# Patient Record
Sex: FEMALE | Race: WHITE | Employment: FULL TIME | URBAN - METROPOLITAN AREA
[De-identification: names, ages, dates, MRNs, and addresses within clinical notes are randomized per-mention and may not be internally consistent; named-entity substitution may affect disease eponyms.]

---
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
## 2021-04-05 ENCOUNTER — OFFICE VISIT (OUTPATIENT)
Dept: URGENT CARE | Facility: CLINIC | Age: 21
End: 2021-04-05
Payer: COMMERCIAL

## 2021-04-05 ENCOUNTER — HOSPITAL ENCOUNTER (EMERGENCY)
Facility: MEDICAL CENTER | Age: 21
End: 2021-04-05
Attending: EMERGENCY MEDICINE | Admitting: EMERGENCY MEDICINE
Payer: COMMERCIAL

## 2021-04-05 VITALS
HEIGHT: 68 IN | DIASTOLIC BLOOD PRESSURE: 68 MMHG | OXYGEN SATURATION: 100 % | RESPIRATION RATE: 16 BRPM | BODY MASS INDEX: 21.15 KG/M2 | SYSTOLIC BLOOD PRESSURE: 113 MMHG | WEIGHT: 139.55 LBS | HEART RATE: 97 BPM | TEMPERATURE: 98.8 F

## 2021-04-05 VITALS
SYSTOLIC BLOOD PRESSURE: 112 MMHG | OXYGEN SATURATION: 96 % | TEMPERATURE: 99 F | HEART RATE: 120 BPM | BODY MASS INDEX: 21.67 KG/M2 | RESPIRATION RATE: 16 BRPM | HEIGHT: 68 IN | DIASTOLIC BLOOD PRESSURE: 70 MMHG | WEIGHT: 143 LBS

## 2021-04-05 DIAGNOSIS — R10.9 ACUTE ABDOMINAL PAIN IN RIGHT FLANK: ICD-10-CM

## 2021-04-05 DIAGNOSIS — N39.0 URINARY TRACT INFECTION WITH HEMATURIA, SITE UNSPECIFIED: Primary | ICD-10-CM

## 2021-04-05 DIAGNOSIS — R31.9 URINARY TRACT INFECTION WITH HEMATURIA, SITE UNSPECIFIED: Primary | ICD-10-CM

## 2021-04-05 LAB
ALBUMIN SERPL BCP-MCNC: 4.5 G/DL (ref 3.2–4.9)
ALBUMIN/GLOB SERPL: 1.5 G/DL
ALP SERPL-CCNC: 73 U/L (ref 30–99)
ALT SERPL-CCNC: 20 U/L (ref 2–50)
ANION GAP SERPL CALC-SCNC: 14 MMOL/L (ref 7–16)
APPEARANCE UR: CLEAR
APPEARANCE UR: NORMAL
AST SERPL-CCNC: 26 U/L (ref 12–45)
BACTERIA #/AREA URNS HPF: ABNORMAL /HPF
BASOPHILS # BLD AUTO: 0.3 % (ref 0–1.8)
BASOPHILS # BLD: 0.02 K/UL (ref 0–0.12)
BILIRUB SERPL-MCNC: 0.5 MG/DL (ref 0.1–1.5)
BILIRUB UR QL STRIP.AUTO: NEGATIVE
BILIRUB UR STRIP-MCNC: NEGATIVE MG/DL
BUN SERPL-MCNC: 7 MG/DL (ref 8–22)
CALCIUM SERPL-MCNC: 9.7 MG/DL (ref 8.5–10.5)
CHLORIDE SERPL-SCNC: 102 MMOL/L (ref 96–112)
CO2 SERPL-SCNC: 23 MMOL/L (ref 20–33)
COLOR UR AUTO: YELLOW
COLOR UR: YELLOW
CREAT SERPL-MCNC: 0.71 MG/DL (ref 0.5–1.4)
EOSINOPHIL # BLD AUTO: 0.02 K/UL (ref 0–0.51)
EOSINOPHIL NFR BLD: 0.3 % (ref 0–6.9)
EPI CELLS #/AREA URNS HPF: NEGATIVE /HPF
ERYTHROCYTE [DISTWIDTH] IN BLOOD BY AUTOMATED COUNT: 42.6 FL (ref 35.9–50)
FLUAV RNA SPEC QL NAA+PROBE: NEGATIVE
FLUBV RNA SPEC QL NAA+PROBE: NEGATIVE
GLOBULIN SER CALC-MCNC: 3 G/DL (ref 1.9–3.5)
GLUCOSE SERPL-MCNC: 87 MG/DL (ref 65–99)
GLUCOSE UR STRIP.AUTO-MCNC: NEGATIVE MG/DL
GLUCOSE UR STRIP.AUTO-MCNC: NEGATIVE MG/DL
HCG UR QL: NEGATIVE
HCT VFR BLD AUTO: 44.2 % (ref 37–47)
HGB BLD-MCNC: 15 G/DL (ref 12–16)
HYALINE CASTS #/AREA URNS LPF: ABNORMAL /LPF
IMM GRANULOCYTES # BLD AUTO: 0.02 K/UL (ref 0–0.11)
IMM GRANULOCYTES NFR BLD AUTO: 0.3 % (ref 0–0.9)
INT CON NEG: NEGATIVE
INT CON POS: POSITIVE
KETONES UR STRIP.AUTO-MCNC: 15 MG/DL
KETONES UR STRIP.AUTO-MCNC: NEGATIVE MG/DL
LACTATE BLD-SCNC: 1.3 MMOL/L (ref 0.5–2)
LEUKOCYTE ESTERASE UR QL STRIP.AUTO: ABNORMAL
LEUKOCYTE ESTERASE UR QL STRIP.AUTO: NORMAL
LYMPHOCYTES # BLD AUTO: 1.36 K/UL (ref 1–4.8)
LYMPHOCYTES NFR BLD: 19.3 % (ref 22–41)
MCH RBC QN AUTO: 32.3 PG (ref 27–33)
MCHC RBC AUTO-ENTMCNC: 33.9 G/DL (ref 33.6–35)
MCV RBC AUTO: 95.1 FL (ref 81.4–97.8)
MICRO URNS: ABNORMAL
MONOCYTES # BLD AUTO: 0.48 K/UL (ref 0–0.85)
MONOCYTES NFR BLD AUTO: 6.8 % (ref 0–13.4)
NEUTROPHILS # BLD AUTO: 5.15 K/UL (ref 2–7.15)
NEUTROPHILS NFR BLD: 73 % (ref 44–72)
NITRITE UR QL STRIP.AUTO: NEGATIVE
NITRITE UR QL STRIP.AUTO: NEGATIVE
NRBC # BLD AUTO: 0 K/UL
NRBC BLD-RTO: 0 /100 WBC
PH UR STRIP.AUTO: 7.5 [PH] (ref 5–8)
PH UR STRIP.AUTO: 7.5 [PH] (ref 5–8)
PLATELET # BLD AUTO: 222 K/UL (ref 164–446)
PMV BLD AUTO: 12.1 FL (ref 9–12.9)
POC URINE PREGNANCY TEST: NEGATIVE
POTASSIUM SERPL-SCNC: 3.9 MMOL/L (ref 3.6–5.5)
PROT SERPL-MCNC: 7.5 G/DL (ref 6–8.2)
PROT UR QL STRIP: NEGATIVE MG/DL
PROT UR QL STRIP: NEGATIVE MG/DL
RBC # BLD AUTO: 4.65 M/UL (ref 4.2–5.4)
RBC # URNS HPF: ABNORMAL /HPF
RBC UR QL AUTO: ABNORMAL
RBC UR QL AUTO: NORMAL
RSV RNA SPEC QL NAA+PROBE: NEGATIVE
SARS-COV-2 RNA RESP QL NAA+PROBE: NOTDETECTED
SODIUM SERPL-SCNC: 139 MMOL/L (ref 135–145)
SP GR UR STRIP.AUTO: 1
SP GR UR STRIP.AUTO: 1.02
SPECIMEN SOURCE: NORMAL
UROBILINOGEN UR STRIP-MCNC: 0.2 MG/DL
UROBILINOGEN UR STRIP.AUTO-MCNC: 0.2 MG/DL
WBC # BLD AUTO: 7.1 K/UL (ref 4.8–10.8)
WBC #/AREA URNS HPF: ABNORMAL /HPF

## 2021-04-05 PROCEDURE — 99284 EMERGENCY DEPT VISIT MOD MDM: CPT

## 2021-04-05 PROCEDURE — 0241U HCHG SARS-COV-2 COVID-19 NFCT DS RESP RNA 4 TRGT MIC: CPT

## 2021-04-05 PROCEDURE — 81002 URINALYSIS NONAUTO W/O SCOPE: CPT | Performed by: NURSE PRACTITIONER

## 2021-04-05 PROCEDURE — 99214 OFFICE O/P EST MOD 30 MIN: CPT | Performed by: NURSE PRACTITIONER

## 2021-04-05 PROCEDURE — 87186 SC STD MICRODIL/AGAR DIL: CPT

## 2021-04-05 PROCEDURE — 700105 HCHG RX REV CODE 258: Performed by: EMERGENCY MEDICINE

## 2021-04-05 PROCEDURE — 80053 COMPREHEN METABOLIC PANEL: CPT

## 2021-04-05 PROCEDURE — 81025 URINE PREGNANCY TEST: CPT | Performed by: NURSE PRACTITIONER

## 2021-04-05 PROCEDURE — 83605 ASSAY OF LACTIC ACID: CPT

## 2021-04-05 PROCEDURE — 96375 TX/PRO/DX INJ NEW DRUG ADDON: CPT

## 2021-04-05 PROCEDURE — 94760 N-INVAS EAR/PLS OXIMETRY 1: CPT

## 2021-04-05 PROCEDURE — 87077 CULTURE AEROBIC IDENTIFY: CPT

## 2021-04-05 PROCEDURE — 87086 URINE CULTURE/COLONY COUNT: CPT

## 2021-04-05 PROCEDURE — 96365 THER/PROPH/DIAG IV INF INIT: CPT

## 2021-04-05 PROCEDURE — 85025 COMPLETE CBC W/AUTO DIFF WBC: CPT

## 2021-04-05 PROCEDURE — 81025 URINE PREGNANCY TEST: CPT

## 2021-04-05 PROCEDURE — C9803 HOPD COVID-19 SPEC COLLECT: HCPCS | Performed by: EMERGENCY MEDICINE

## 2021-04-05 PROCEDURE — 81001 URINALYSIS AUTO W/SCOPE: CPT

## 2021-04-05 PROCEDURE — 87040 BLOOD CULTURE FOR BACTERIA: CPT

## 2021-04-05 PROCEDURE — 700111 HCHG RX REV CODE 636 W/ 250 OVERRIDE (IP): Performed by: EMERGENCY MEDICINE

## 2021-04-05 RX ORDER — ONDANSETRON 2 MG/ML
4 INJECTION INTRAMUSCULAR; INTRAVENOUS ONCE
Status: COMPLETED | OUTPATIENT
Start: 2021-04-05 | End: 2021-04-05

## 2021-04-05 RX ORDER — SULFAMETHOXAZOLE AND TRIMETHOPRIM 800; 160 MG/1; MG/1
1 TABLET ORAL 2 TIMES DAILY
Qty: 14 TABLET | Refills: 0 | Status: SHIPPED | OUTPATIENT
Start: 2021-04-05 | End: 2021-04-12

## 2021-04-05 RX ORDER — SODIUM CHLORIDE 9 MG/ML
1000 INJECTION, SOLUTION INTRAVENOUS ONCE
Status: COMPLETED | OUTPATIENT
Start: 2021-04-05 | End: 2021-04-05

## 2021-04-05 RX ORDER — KETOROLAC TROMETHAMINE 30 MG/ML
15 INJECTION, SOLUTION INTRAMUSCULAR; INTRAVENOUS ONCE
Status: COMPLETED | OUTPATIENT
Start: 2021-04-05 | End: 2021-04-05

## 2021-04-05 RX ADMIN — ONDANSETRON 4 MG: 2 INJECTION INTRAMUSCULAR; INTRAVENOUS at 16:54

## 2021-04-05 RX ADMIN — KETOROLAC TROMETHAMINE 15 MG: 30 INJECTION, SOLUTION INTRAMUSCULAR at 18:38

## 2021-04-05 RX ADMIN — CEFTRIAXONE SODIUM 2 G: 2 INJECTION, POWDER, FOR SOLUTION INTRAMUSCULAR; INTRAVENOUS at 18:38

## 2021-04-05 RX ADMIN — SODIUM CHLORIDE 1000 ML: 9 INJECTION, SOLUTION INTRAVENOUS at 16:53

## 2021-04-05 ASSESSMENT — ENCOUNTER SYMPTOMS
CHILLS: 0
BACK PAIN: 1
FEVER: 1
SHORTNESS OF BREATH: 0
FLANK PAIN: 1
ABDOMINAL PAIN: 1
VOMITING: 1
NAUSEA: 1

## 2021-04-05 ASSESSMENT — PAIN DESCRIPTION - PAIN TYPE: TYPE: ACUTE PAIN

## 2021-04-05 NOTE — ED TRIAGE NOTES
Sherrie Cordoba  20 y.o.  Chief Complaint   Patient presents with   • Flank Pain     R flank pain radiating to RLQ x 4 days; reports N/V, fevers, malaise; pt reports hx of pyelonephritis multiple times; reports UC saw hematuria; reports malodorous urine; denies dysuria, frequency, urgency

## 2021-04-05 NOTE — ED PROVIDER NOTES
ED Provider Note    Scribed for KAYA Bojorquez II* by Venice Mireles. 4/5/2021  4:23 PM    Means of Arrival: Walk in  History obtained by: patient   Limitations: none    CHIEF COMPLAINT  Chief Complaint   Patient presents with   • Flank Pain     R flank pain radiating to RLQ x 4 days; reports N/V, fevers, malaise; pt reports hx of pyelonephritis multiple times; reports UC saw hematuria; reports malodorous urine; denies dysuria, frequency, urgency       HPI  Sherrie Cordoba is a 20 y.o. female who presents to the Emergency Department for right sided flank pain onset Thursday. Patient has associated symptoms of lower back pain, malodorous urine, urinary frequency, low grade fever, nausea, vomiting, and suprapubic abdominal pain. She states she has experienced similar symptoms previously before and was diagnosed with kidney infection. She was seen at urgent care earlier today and was advised to come to the ED secondary to hematuria.     REVIEW OF SYSTEMS  Review of Systems   Constitutional: Positive for fever. Negative for chills.   Respiratory: Negative for shortness of breath.    Cardiovascular: Negative for chest pain.   Gastrointestinal: Positive for abdominal pain, nausea and vomiting.   Genitourinary: Positive for flank pain and frequency. Negative for dysuria.        Positive for malodorous urine   Musculoskeletal: Positive for back pain.   All other systems reviewed and are negative.    See HPI for further details.    PAST MEDICAL HISTORY   None pertinent    SOCIAL HISTORY  Social History     Tobacco Use   • Smoking status: Never Smoker   • Smokeless tobacco: Never Used   Substance and Sexual Activity   • Alcohol use: No   • Drug use: No   • Sexual activity: Never       SURGICAL HISTORY  patient denies any surgical history    CURRENT MEDICATIONS  Home Medications    **Home medications have not yet been reviewed for this encounter**         ALLERGIES  Allergies   Allergen Reactions   • Nkda [No Known  "Drug Allergy]        PHYSICAL EXAM  VITAL SIGNS: /75   Pulse 66   Temp 37.1 °C (98.8 °F) (Temporal)   Resp 16   Ht 1.727 m (5' 8\")   Wt 63.3 kg (139 lb 8.8 oz)   SpO2 99%   BMI 21.22 kg/m²     Pulse ox interpretation: I interpret this pulse ox as normal.  Constitutional: Alert in no apparent distress.  HENT: No signs of trauma, Bilateral external ears normal, Nose normal.   Eyes: Pupils are equal, Conjunctiva normal, Non-icteric.   Neck: Normal range of motion, No tenderness, Supple, No stridor.   Cardiovascular: Increased rate and regular rhythm, no murmurs. Symmetric distal pulses. No cyanosis of extremities. No peripheral edema of extremities.  Thorax & Lungs: Normal breath sounds, No respiratory distress, No wheezing, No chest tenderness.   Abdomen: Bowel sounds normal, Soft, suprapubic tenderness, No masses, No pulsatile masses. No peritoneal signs.  Skin: Warm, Dry, No erythema, No rash.   Back: No midline bony tenderness, Right CVA tenderness to percussion.   Musculoskeletal: Good range of motion in all major joints. No tenderness to palpation or major deformities noted.   Neurologic: Alert , Normal motor function, Normal sensory function, No focal deficits noted.   Psychiatric: Affect normal, Judgment normal, Mood normal.       DIAGNOSTIC STUDIES / PROCEDURES    LABS  Pertinent Labs & Imaging studies reviewed. (See chart for details)    COURSE & MEDICAL DECISION MAKING  Pertinent Labs & Imaging studies reviewed. (See chart for details)    4:23 PM This is a 20 y.o. female who presents with flank pain and the differential diagnosis includes but is not limited to, very concerning for pyelonephritis.  Will evaluate further for sepsis.  Ordered for lactic aid, COVID/SARS, CBC w/ diff, CMP, blood culture, UA, urine culture, and Beta HCG to evaluate. Patient will be treated with Zofran 4 mg for nausea, and IV fluids for tachycardia.     I have ordered continuous pulse ox and cardiac monitoring. Pulse " ox shows a normal wave form with normal saturations >95% on room air. Cardiac monitors show a normal sinus rhythm at a rate of 115 bpm without ectopy.     6:11 PM Patient was reevaluated at bedside. She is feeling slightly improved at this time. Discussed lab and radiology results with the patient and informed them that she will be treated with IV rocephin here in the ER.  Serum studies not reveal leukocytosis.  Overall serum studies within acceptable limits.  Blood cultures are pending.  Urine culture is also pending.  Urinalysis with  white blood cells.  2-5 red blood cells.  There is also bacteria present.  There could be similar findings if there was a renal stone however her history of having strong odor to her urine, pain with urination, urinary frequency then followed by flank pain I believe this presentation more likely consistent with pyelonephritis.  Prescribed bactrim. She will be treated with Toradol 15 mg for continuing pain. Discussed return precautions. Patient will be discharged at this time. She verbalizes agreement with discharge and plan of care.    HYDRATION: Based on the patient's presentation of Tachycardia the patient was given IV fluids. IV Hydration was used because oral hydration was not adequate alone. Upon recheck following hydration, the patient was improved.     The patient will return for worsening symptoms and is stable at the time of discharge. The patient verbalizes understanding and will comply. Guidance provided on appropriate use of medications.    DISPOSITION:  Patient will be discharged home in stable condition.    FOLLOW UP:  [facility], Emergency Dept  [address] [address]  [phone]    Temperature >101F, frequent vomiting, worsening pain or other serious concerns come back to the ER      OUTPATIENT MEDICATIONS:  Discharge Medication List as of 4/5/2021  7:12 PM      START taking these medications    Details    sulfamethoxazole-trimethoprim (BACTRIM DS) 800-160 MG tablet Take 1 tablet by mouth 2 times a day for 7 days., Disp-14 tablet, R-0, Normal             FINAL IMPRESSION  1. Urinary tract infection with hematuria, site unspecified Active         Venice GIBBS (Scribbell), am scribing for, and in the presence of, CYDNEY Bojorquez II.    Electronically signed by: Venice Mireles (Katty), 4/5/2021    IAftab II, M* personally performed the services described in this documentation, as scribed by Venice Mireles in my presence, and it is both accurate and complete. C    The note accurately reflects work and decisions made by me.  Aftab Brooks II, M.D.  4/5/2021  8:30 PM

## 2021-04-05 NOTE — PROGRESS NOTES
Chief Complaint   Patient presents with   • UTI     x 5 days pt states she has hx of kidney infections. pt stated symptoms were worse at night       HISTORY OF PRESENT ILLNESS: Patient is a pleasant 20 y.o. female who presents to urgent care today with complaints of right abdominal and flank pain.  Her symptoms started 4 days ago with severe pain, the pain lasted a few days, improved from 1 day, then again returned.  Admits to associated fever, chills, malaise, vomiting, nausea.  Denies any dysuria, urinary urgency or frequency.  She does have a history of kidney infection, this feels somewhat similar.    Patient Active Problem List    Diagnosis Date Noted   • Vitamin D deficiency 07/12/2018   • Vasovagal syncope 05/30/2018       Allergies:Nkda [no known drug allergy]    Current Outpatient Medications Ordered in Epic   Medication Sig Dispense Refill   • ascorbic acid (ASCORBIC ACID) 500 MG Tab Take 500 mg by mouth every day.     • Pseudoephedrine-APAP-DM (DAYQUIL PO) Take  by mouth.     • ibuprofen (MOTRIN) 200 MG Tab Take 400 mg by mouth every 6 hours as needed.       No current Epic-ordered facility-administered medications on file.       History reviewed. No pertinent past medical history.    Social History     Tobacco Use   • Smoking status: Never Smoker   • Smokeless tobacco: Never Used   Substance Use Topics   • Alcohol use: No   • Drug use: No       Family Status   Relation Name Status   • Mo  Alive   • Fa  Alive   History reviewed. No pertinent family history.    ROS:  Review of Systems   Constitutional: Positive for fever, chills,malaise, and fatigue.   HENT: Negative for ear pain, nosebleeds, congestion, sore throat and neck pain.    Eyes: Negative for vision changes.   Neuro: Negative for headache, sensory changes, weakness, seizure, LOC.   Cardiovascular: Negative for chest pain, palpitations, orthopnea and leg swelling.   Respiratory: Negative for cough, sputum production, shortness of breath and  "wheezing.   Gastrointestinal: Positive for right-sided abdominal pain, nausea, vomiting.   Negative for diarrhea.   Genitourinary: Positive for right-sided flank pain.  Negative for dysuria, urgency and frequency.  Musculoskeletal: Negative for falls, neck pain, back pain, joint pain, myalgias.   Skin: Negative for rash, diaphoresis.     Exam:  /70 (BP Location: Left arm, Patient Position: Sitting, BP Cuff Size: Adult)   Pulse (!) 120   Temp 37.2 °C (99 °F) (Temporal)   Resp 16   Ht 1.727 m (5' 8\")   Wt 64.9 kg (143 lb)   SpO2 96%   General: well-nourished, well-developed female in NAD  Head: normocephalic, atraumatic  Eyes: PERRLA, no conjunctival injection, acuity grossly intact, lids normal.  Ears: normal shape and symmetry, no tenderness, no discharge. External canals are without any significant edema or erythema. Tympanic membranes are without any inflammation, no effusion. Gross auditory acuity is intact.  Nose: symmetrical without tenderness, no discharge.  Mouth/Throat: reasonable hygiene, no erythema, exudates or tonsillar enlargement.  Neck: no masses, range of motion within normal limits, no tracheal deviation. No obvious thyroid enlargement.   Lymph: no cervical adenopathy. No supraclavicular adenopathy.   Neuro: alert and oriented. Cranial nerves 1-12 grossly intact. No sensory deficit.   Cardiovascular: Tachycardic rate and regular rhythm. No edema.  Pulmonary: no distress. Chest is symmetrical with respiration, no wheezes, crackles, or rhonchi.   Abdomen: soft, right lateral tenderness, no guarding, no hepatosplenomegaly.  Right-sided CVA tenderness  Musculoskeletal: no clubbing, appropriate muscle tone, gait is stable.  Skin: warm, dry, intact, no clubbing, no cyanosis, no rashes.   Psych: appropriate mood, affect, judgement.       POC pregnancy negative      POC urine positive for ketones, blood, leukocytes      Assessment/Plan:  1. Acute abdominal pain in right flank  POCT Pregnancy    " POCT Urinalysis       The patient is a 20-year-old female who presents to the urgent care today with complaints of right-sided abdominal and flank pain. Differential diagnoses include but are not limited to: Pyelonephritis, nephrolithiasis, appendicitis.  The patient is tachycardic in clinic at 120 and has correlating abdominal tenderness. At this time, I feel the patient requires a higher level of care in the ED for closer monitoring, stat lab work and/or imaging for further evaluation. This has been discussed with the patient and she states agreement and understanding. The patient is stable to leave POV at this time and will go directly to ED without delay, will remain n.p.o.         Please note that this dictation was created using voice recognition software. I have made every reasonable attempt to correct obvious errors, but I expect that there are errors of grammar and possibly content that I did not discover before finalizing the note.      ANNALISE Nolen.

## 2021-04-06 NOTE — ED NOTES
Agree with triage note.     Pt is alert and oriented, speaking in full sentences, follows commands and responds appropriately to questions. NAD. Resp are even and unlabored.     Patient and staff wearing appropriate PPE

## 2021-04-06 NOTE — ED NOTES
DC home with instructions for UTI. She has been provided written and verbal education for worsening s/s. 1 prescription have been sent to her [facility] pharmacy. She verbalized understanding of how to take that medication. [facility]

## 2021-04-07 NOTE — ED NOTES
"ED Positive Culture Follow-up/Notification Note:    Date: 4/7/2021     Patient seen in the ED on 4/5/2021 for flank pain.   1. Urinary tract infection with hematuria, site unspecified Active      Discharge Medication List as of 4/5/2021  7:12 PM      START taking these medications    Details   sulfamethoxazole-trimethoprim (BACTRIM DS) 800-160 MG tablet Take 1 tablet by mouth 2 times a day for 7 days., Disp-14 tablet, R-0, Normal           Ceftriaxone 2g IV x1 given in ED     Allergies: Nkda [no known drug allergy]     Vitals:    04/05/21 1423 04/05/21 1448 04/05/21 1623 04/05/21 1704   BP: 103/75  112/61 113/68   Pulse: 66  (!) 102 97   Resp: 16      Temp: 37.1 °C (98.8 °F)      TempSrc: Temporal      SpO2: 99%  98% 100%   Weight:  63.3 kg (139 lb 8.8 oz)     Height: 1.727 m (5' 8\")          Final cultures:   Results     Procedure Component Value Units Date/Time    URINE CULTURE(NEW) [[id number]]  (Abnormal) Collected: 04/05/21 1608    Order Status: Completed Specimen: Urine, Clean Catch Updated: 04/07/21 0913     Significant Indicator POS     Source UR     Site URINE, CLEAN CATCH     Culture Result -      Staphylococcus saprophyticus  10-50,000 cfu/mL      Narrative:      Indication for culture:->Evaluation for sepsis without a  clear source of infection  Indication for culture:->Evaluation for sepsis without a    BLOOD CULTURE [[id number]] Collected: 04/05/21 1650    Order Status: Completed Specimen: Blood from Peripheral Updated: 04/06/21 0855     Significant Indicator NEG     Source BLD     Site PERIPHERAL     Culture Result No Growth  Note: Blood cultures are incubated for 5 days and  are monitored continuously.Positive blood cultures  are called to the RN and reported as soon as  they are identified.      Narrative:      1 of 2 for Blood Culture x 2 sites order. Per Hospital  Policy: Only change Specimen Src: to \"Line\" if specified by  physician order.  No site indicated    BLOOD CULTURE [[id number]] Collected: " "04/05/21 1702    Order Status: Completed Specimen: Blood from Peripheral Updated: 04/06/21 0855     Significant Indicator NEG     Source BLD     Site PERIPHERAL     Culture Result No Growth  Note: Blood cultures are incubated for 5 days and  are monitored continuously.Positive blood cultures  are called to the RN and reported as soon as  they are identified.      Narrative:      2 of 2 blood culture x2  Sites order. Per Hospital Policy:  Only change Specimen Src: to \"Line\" if specified by physician  order.  No site indicated    COV-2, FLU A/B, AND RSV BY PCR (2-4 HOURS CEPHEID): Collect NP swab in VTM [[id number]] Collected: 04/05/21 1650    Order Status: Completed Specimen: Respirate from Nasopharyngeal Updated: 04/05/21 1751     Influenza virus A RNA Negative     Influenza virus B, PCR Negative     RSV, PCR Negative     SARS-CoV-2 by PCR NotDetected     Comment: PATIENTS: Important information regarding your results and instructions can  be found at [email or web address]   \"After your  Covid-19 Test\"  [facility] providers: PLEASE REFER TO DE-ESCALATION AND RETESTING PROTOCOL  on [email or web address]  **The [employer] GeneXpert Xpress SARS-CoV-2 RT-PCR Test has been made  available for use under the Emergency Use Authorization (EUA) only.          SARS-CoV-2 Source NP Swab    Narrative:      Have you been in close contact with a person who is suspected  or known to be positive for COVID-19 within the last 30 days  (e.g. last seen that person < 30 days ago)->Unknown    URINALYSIS [[id number]]  (Abnormal) Collected: 04/05/21 1608    Order Status: Completed Specimen: Urine, Clean Catch Updated: 04/05/21 1747     Color Yellow     Character Clear     Specific Gravity 1.005     Ph 7.5     Glucose Negative mg/dL      Ketones Negative mg/dL      Protein Negative mg/dL      Bilirubin Negative     Urobilinogen, Urine 0.2     Nitrite Negative     Leukocyte Esterase Large     Occult Blood Small     Micro Urine " Req Microscopic    Narrative:      Indication for culture:->Evaluation for sepsis without a  clear source of infection          Plan:   Staphylococcus saprophyticus can be a cause of UTI, likely covered by Bactrim. Due to lack of sensitivities will contact patient to ensure improvement.   Patient stated flank pain has overall improved, she did state shes been having some nausea since starting antibiotics. Denied fever or chills   Gave patient strict return precautions if nausea does not resolve, flank pain worsens or if patient gets febrile or chills to return to the ER. She agreed.   I have also contacted micro to get susceptibilities on staph saprophyticus, I will follow up with results.     Addendum 4/8/2021 1650   Contacted [facility] to follow up on sensitives, micro said they do not run sensitivities on staph saprophyticus in urine. Patient has return precautions in place.     Nathan Gustafson, PharmD

## 2021-04-10 LAB
BACTERIA BLD CULT: NORMAL
BACTERIA BLD CULT: NORMAL
SIGNIFICANT IND 70042: NORMAL
SIGNIFICANT IND 70042: NORMAL
SITE SITE: NORMAL
SITE SITE: NORMAL
SOURCE SOURCE: NORMAL
SOURCE SOURCE: NORMAL

## 2024-09-07 ENCOUNTER — OFFICE VISIT (OUTPATIENT)
Dept: URGENT CARE | Facility: PHYSICIAN GROUP | Age: 24
End: 2024-09-07
Payer: COMMERCIAL

## 2024-09-07 VITALS
HEART RATE: 83 BPM | HEIGHT: 68 IN | OXYGEN SATURATION: 98 % | BODY MASS INDEX: 19.4 KG/M2 | SYSTOLIC BLOOD PRESSURE: 98 MMHG | WEIGHT: 128 LBS | DIASTOLIC BLOOD PRESSURE: 62 MMHG | TEMPERATURE: 98.3 F | RESPIRATION RATE: 16 BRPM

## 2024-09-07 DIAGNOSIS — L30.9 PERIORBITAL DERMATITIS: ICD-10-CM

## 2024-09-07 PROCEDURE — 3074F SYST BP LT 130 MM HG: CPT | Performed by: PHYSICIAN ASSISTANT

## 2024-09-07 PROCEDURE — 3078F DIAST BP <80 MM HG: CPT | Performed by: PHYSICIAN ASSISTANT

## 2024-09-07 PROCEDURE — 99213 OFFICE O/P EST LOW 20 MIN: CPT | Performed by: PHYSICIAN ASSISTANT

## 2024-09-07 RX ORDER — TRIAMCINOLONE ACETONIDE 0.25 MG/G
1 CREAM TOPICAL 2 TIMES DAILY
Qty: 15 G | Refills: 0 | Status: SHIPPED | OUTPATIENT
Start: 2024-09-07

## 2024-09-07 RX ORDER — METHADONE HYDROCHLORIDE 40 MG/1
36 TABLET ORAL
COMMUNITY

## 2024-09-07 ASSESSMENT — ENCOUNTER SYMPTOMS: EYE REDNESS: 1

## 2024-09-07 NOTE — LETTER
[facility] [address] [address]     September 7, 2024    Patient: Sherrie Cordoba   YOB: 2000   Date of Visit: 9/7/2024       To Whom It May Concern:    Sherrie Cordoba was seen and treated in our department on 9/7/2024.  Please excuse her from work today.    Sincerely,     Duncan Beckett P.A.-C.

## 2024-09-07 NOTE — PROGRESS NOTES
Chief Complaint   Patient presents with    Eye Swelling     Bilateral eye swelling, skincare has changed recently, has used aquafor on eyes and had a rash on eyes, 4 days ago eyes were peeling and used a eye cream and it burned eyes, woke up this morning with eyes swelling, blurry vision, eyes feels heavy, has used aquafor around ears and had the same rash and now its in the peeling stage        HISTORY OF PRESENT ILLNESS: Patient is a 24 y.o. female who presents today because bilateral eyes swelling for the last several weeks.  She had changed acne cleansing medicines and thinks that this may have been the trigger.  She has been using Aquaphor since which she has used in the past and has always had periorbital facial rashes.  She has not seen dermatology.  She denies a history of asthma or eczema.  She used Aquaphor again last night and woke up with an even more irritated rash.  She denies any change in vision or sinus pressure.    Patient Active Problem List    Diagnosis Date Noted    Vitamin D deficiency 07/12/2018    Vasovagal syncope 05/30/2018       Allergies:Nkda [no known drug allergy]    Current Outpatient Medications Ordered in Epic   Medication Sig Dispense Refill    methadone (DOLOPHINE) 40 MG TABLET SOLUBLE Take 36 mg by mouth.      ascorbic acid (ASCORBIC ACID) 500 MG Tab Take 500 mg by mouth every day. (Patient not taking: Reported on 9/7/2024)      Pseudoephedrine-APAP-DM (DAYQUIL PO) Take  by mouth. (Patient not taking: Reported on 9/7/2024)      ibuprofen (MOTRIN) 200 MG Tab Take 400 mg by mouth every 6 hours as needed. (Patient not taking: Reported on 9/7/2024)       No current [facility]-ordered facility-administered medications on file.       History reviewed. No pertinent past medical history.    Social History     Tobacco Use    Smoking status: Never    Smokeless tobacco: Never   Vaping Use    Vaping status: Never Used   Substance Use Topics    Alcohol use: No    Drug use: No       Family Status  "  Relation Name Status    Mo  Alive    Fa  Alive   No partnership data on file   History reviewed. No pertinent family history.    Review of Systems   Eyes:  Positive for redness.   Skin:  Positive for rash.   All other systems reviewed and are negative.      Exam:  BP 98/62 (BP Location: Right arm, Patient Position: Sitting, BP Cuff Size: Adult)   Pulse 83   Temp 36.8 °C (98.3 °F) (Temporal)   Resp 16   Ht 1.727 m (5' 8\")   Wt 58.1 kg (128 lb)   SpO2 98%     Nursing notes and vitals reviewed.    Constitutional:   Appropriately groomed, pleasant affect, well nourished, in NAD.    Head:   Normocephalic, atraumatic.    Eyes:   PERRLA, EOM's full, sclera injected, conjunctiva not erythematous, and medial canthus without exudate.  No preauricular lymphadenopathy.  Periorbital erythema and fine scaling present    Ears:  Hearing grossly intact to voice.    Nose:  Nares patent bilaterally.  Nasal mucosa not edematous.      Throat:  Dentition wnl, mucosa moist without lesions.  Oropharynx not erythematous, with no enlargement of the palatine tonsils bilaterally with no exudates.    No post nasal drainage present.  Soft palate rises symmetrically bilaterally and uvula midline.      Neck: Neck supple, with mild anterior lymphadenopathy that is soft and mobile to palpation. Thyroid non-palpable without tenderness or nodules. No supraclavicular lymphadenopathy.    Lungs:  Respiratory effort not labored without accessory muscle use.     Musculoskeletal:  Gait non-antalgic with a narrow base.    Derm:  Skin without rashes or lesions with good turgor pressure.      Psychiatric:  Mood, affect, and judgement appropriate.    Please note that this dictation was created using voice recognition software. I have made every reasonable attempt to correct obvious errors, but I expect that there are errors of grammar and possibly content that I did not discover before finalizing the note.    Assessment/Plan:  1. Periorbital dermatitis  " Referral to Dermatology    triamcinolone acetonide (KENALOG) 0.025 % Cream        Patient presents with periorbital dermatitis/eczema.  Plan to treat with a low potency topical steroid referred patient to dermatology for definitive management.  Patient has a history of recurrent periorbital dermatitis and we did discuss possible allergic causes.  Advised against face washes or creams at this time.  She can use warm water to rinse.    Instructed to return to Urgent Care or nearest Emergency Department if symptoms fail to improve, for any change in condition, further concerns, or new concerning symptoms. Patient states understanding of the plan of care and discharge instructions.    Duncan Beckett PA-C

## 2025-05-20 ENCOUNTER — HOSPITAL ENCOUNTER (OUTPATIENT)
Facility: MEDICAL CENTER | Age: 25
End: 2025-05-20
Attending: STUDENT IN AN ORGANIZED HEALTH CARE EDUCATION/TRAINING PROGRAM
Payer: COMMERCIAL

## 2025-05-20 ENCOUNTER — OFFICE VISIT (OUTPATIENT)
Dept: OBGYN | Facility: CLINIC | Age: 25
End: 2025-05-20
Payer: COMMERCIAL

## 2025-05-20 VITALS
BODY MASS INDEX: 20.66 KG/M2 | DIASTOLIC BLOOD PRESSURE: 77 MMHG | HEIGHT: 68 IN | WEIGHT: 136.3 LBS | SYSTOLIC BLOOD PRESSURE: 107 MMHG | HEART RATE: 130 BPM

## 2025-05-20 DIAGNOSIS — Z01.419 WOMEN'S ANNUAL ROUTINE GYNECOLOGICAL EXAMINATION: ICD-10-CM

## 2025-05-20 DIAGNOSIS — N76.0 ACUTE VAGINITIS: ICD-10-CM

## 2025-05-20 DIAGNOSIS — Z90.79 HISTORY OF SALPINGECTOMY: ICD-10-CM

## 2025-05-20 DIAGNOSIS — R53.83 OTHER FATIGUE: ICD-10-CM

## 2025-05-20 LAB
CANDIDA DNA VAG QL PROBE+SIG AMP: NEGATIVE
G VAGINALIS DNA VAG QL PROBE+SIG AMP: POSITIVE
T VAGINALIS DNA VAG QL PROBE+SIG AMP: NEGATIVE

## 2025-05-20 PROCEDURE — 99385 PREV VISIT NEW AGE 18-39: CPT | Mod: 25 | Performed by: STUDENT IN AN ORGANIZED HEALTH CARE EDUCATION/TRAINING PROGRAM

## 2025-05-20 PROCEDURE — 87660 TRICHOMONAS VAGIN DIR PROBE: CPT

## 2025-05-20 PROCEDURE — 87480 CANDIDA DNA DIR PROBE: CPT

## 2025-05-20 PROCEDURE — 99213 OFFICE O/P EST LOW 20 MIN: CPT | Performed by: STUDENT IN AN ORGANIZED HEALTH CARE EDUCATION/TRAINING PROGRAM

## 2025-05-20 PROCEDURE — 3074F SYST BP LT 130 MM HG: CPT | Performed by: STUDENT IN AN ORGANIZED HEALTH CARE EDUCATION/TRAINING PROGRAM

## 2025-05-20 PROCEDURE — 99459 PELVIC EXAMINATION: CPT | Performed by: STUDENT IN AN ORGANIZED HEALTH CARE EDUCATION/TRAINING PROGRAM

## 2025-05-20 PROCEDURE — 3078F DIAST BP <80 MM HG: CPT | Performed by: STUDENT IN AN ORGANIZED HEALTH CARE EDUCATION/TRAINING PROGRAM

## 2025-05-20 PROCEDURE — 87591 N.GONORRHOEAE DNA AMP PROB: CPT

## 2025-05-20 PROCEDURE — 87510 GARDNER VAG DNA DIR PROBE: CPT

## 2025-05-20 PROCEDURE — 87491 CHLMYD TRACH DNA AMP PROBE: CPT

## 2025-05-20 RX ORDER — METRONIDAZOLE 500 MG/1
500 TABLET ORAL 2 TIMES DAILY
Qty: 14 TABLET | Refills: 0 | Status: SHIPPED | OUTPATIENT
Start: 2025-05-20 | End: 2025-05-27

## 2025-05-20 NOTE — PROGRESS NOTES
ANNUAL GYNECOLOGY VISIT    Chief Complaint  Annual Exam    Subjective  Sherrie Cordoba is a 25 y.o. female  Patient's last menstrual period was 2025 (exact date). on IUD Mirena (placed May 2020) for contraception who presents today for Annual Exam.     Prior to having IUD Mirena, patient states that periods were irregular/frequent, heavy bleeding, and severe cramping. Since having IUD Mirena, periods significantly improved as they became less frequent, flow was lighter, and cramps improved. However, last month she had a heavier period and more cramping than usual. States it lasted 5 days, changed pads every 2-3 hours.     Patient reports that she has been having vaginal symptoms since getting out of a long term relationship and having new sexual partners in January. She feels that since having new partners, she has been having recurring BV that have self resolved until now. She is having light pink or yellow discharge with fishy odor. Denies pelvic/abd pain, denies vuvlar itching.     She has a history of right salpingectomy 2.5 years ago due to 8 cm paratubal cyst. This was done at [place] (RITCHIE obtained today). Since then, she feels like she has had hormone changes that have affected her mood (decrease libido at times and fatigue) and is interested in evaluating her hormones. 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 Denies SI/HI. Has a good support system, is established with therapy already.     Preventive Care   Immunization History   Administered Date(s) Administered    Dtap Vaccine 2001, 2001, 2001, 10/23/2002, 2005    HPV Quadrivalent Vaccine (GARDASIL) - HISTORICAL DATA 2013, 2013    Hepatitis A Vaccine, Ped/Adol 2004, 2005    Hepatitis B Vaccine Adolescent/Pediatric 2001, 2001, 2001    Hib Vaccine (Prp-d) - HISTORICAL DATA 2001,  2001, 2001, 10/23/2002    IPV 2001, 2001, 2001, 2005    Influenza, live, trivalent, intranasal 10/11/2012    MMR Vaccine 2002, 2005    Pneumococcal Vaccine (PCV7) - HISTORICAL DATA 2001, 2001, 2001, 2002    Tdap Vaccine 10/11/2012    Varicella Vaccine Live 2002, 2008   HPV vaccine: had  Last Pap: Per patient, done last year and was normal (done at [place], RITCHIE obtained today)  History of abnormal pap: No per patient      Gynecology History  Current Sexual Activity: yes - multiple male partners in the past year  Partners: Male  History of sexually transmitted diseases? yes - 2020, Chlamydia and was treated for it. None since then.      Menstrual History  Patient's last menstrual period was 2025 (exact date).  Menarche: 14  See HPI    Contraception  Current: IUD Mirena  Past: Oral CHCs,       Cancer Risk Assessement:  Family history of:   - Breast cancer: no   - Ovarian cancer: no   - Uterine cancer: no   - Colon cancer: no    Obstetric History  OB History    Para Term  AB Living   0 0 0 0 0 0   SAB IAB Ectopic Molar Multiple Live Births   0 0 0 0 0 0       Past Medical History  Past Medical History[1]    Past Surgical History  Past Surgical History[2]    Social History  Social History[3]     Family History  Family History   Problem Relation Age of Onset    No Known Problems Mother     Leukemia Father     Kidney cancer Maternal Grandmother     Alcohol abuse Maternal Grandfather     Alzheimer's Disease Paternal Grandmother     Liver Cancer Paternal Grandfather        Home Medications  Current Outpatient Medications   Medication Sig    metroNIDAZOLE (FLAGYL) 500 MG Tab Take 1 Tablet by mouth 2 times a day for 7 days.    methadone (DOLOPHINE) 40 MG TABLET SOLUBLE Take 36 mg by mouth.       Allergies/Reactions  Allergies[4]    ROS  Review of Systems:  Gen: no fevers or chills, no significant weight loss or  "gain  Respiratory:  no cough or dyspnea  Cardiac:  no chest pain, no palpitations, no syncope  Breast: no breast discharge, pain, lump or skin changes  GI:  no heartburn, no abdominal pain, no nausea or vomiting  Psych: no depression or anxiety  Neuro: no migraines with aura, fainting spells, numbness or tingling    Objective  /77 (BP Location: Left arm, Patient Position: Sitting, BP Cuff Size: Adult)   Pulse (!) 130   Ht 5' 8\"   Wt 136 lb 4.8 oz   LMP 04/22/2025 (Exact Date)   Breastfeeding No   BMI 20.72 kg/m²     Constitutional: The patient is well developed and well nourished.  Psychiatric: Patient is oriented to time place and person.   Skin: No rash observed.  Neck: Appears symmetric. Thyroid normal size  Respiratory: normal effort  Breast: Inspection reveals no asymetry or nipple discharge, no skin thickening, dimpling or erythema.  Palpation demonstrates no masses.  Abdomen: Soft, non-tender.  Pelvic Exam:      Vulva: external female genitalia are normal in appearance. No lesions     Urethra - no lesions, no erythema     Vagina: moist, pink, normal ruggae     Cervix: pink, smooth, no lesions, no CMT. IUD strings visualized.      Uterus - non-tender, normal size, shape, contour, mobile     Ovaries: non-tender, no appreciable masses   Chaperone Present: Timothy Jimenes MA  Extremities: Legs are symmetric and without tenderness. There is no edema present.    Labs/Imaging:  No results found for: \"HDL\", \"LDL\"  No components found for: \"A1C1\"  WBC (K/uL)   Date Value   04/05/2021 7.1     Lab Results   Component Value Date/Time    CO2 23 04/05/2021 1650    BUN 7 (L) 04/05/2021 1650       Patient Active Problem List    Diagnosis Date Noted    Vitamin D deficiency 07/12/2018    Vasovagal syncope 05/30/2018       Assessment & Plan  Sherrie Cordoba is a 25 y.o. female who presents today for Annual Gyn Exam.   Assessment & Plan  Women's annual routine gynecological examination  - Cervical cancer " screening: Pap: Up-to-date. Patient reports last PAP done 1 year ago ([place], RITCHIE obtained).   - STI Screen (HIV, Syphilis, Chlamydia / Gonorrhea): accepted-chlamydia, gonorrhea, and trichomonas  - Diabetes Screen: Not indicated  - Cholesterol Screen: Not indicated  - Counseling: breast self exam and discussed IUD Mirena approved for menorrhagia for 5 years vs pregnancy prevention for 8 years. Patient considering replacement of IUD Mirena.   - Anticipatory guidance given. Encouraged adequate water intake, healthy diet, regular exercise. Educated on Pap smear screening and guidelines for age per ACOG and ASCCP. Discussed safe sex, STI prevention, contraception/family planning. Self breast exam taught.        Acute vaginitis  - Patient reports recurring suspected BV since January that usually self resolves until now.   - Vag Path swab collected today. Patient also desires STI testing.  - Rx sent for Flagyl 500mg BID. Instructions and precautions given, with indication that the patient understands. Benefits and risks of medication were discussed with patient.  - Patient counseled to take medication as prescribed to completion.   - Recommend increase probiotic intake.   - Advised no intercourse until 7 days after final dose of medication.    Orders:    VAGINAL PATHOGENS DNA PANEL; Future    Chlamydia/GC, PCR (Genital/Anal swab); Future    metroNIDAZOLE (FLAGYL) 500 MG Tab; Take 1 Tablet by mouth 2 times a day for 7 days.    Other fatigue  - Patient desires lab work to assess for hormone/mood changes and fatigue.   - Discussed how stress can affect hormones, libido, fatigue along with how her return of menses.   Orders:    CBC WITH DIFFERENTIAL; Future    TSH WITH REFLEX TO FT4; Future    VITAMIN D,25 HYDROXY (DEFICIENCY); Future    History of salpingectomy  - Patient reports history of right salpingectomy 2.5 years ago due to 8 cm paratubal cyst. This was done at [place] (RITCHIE obtained today).        Return: Annually or  AMY Starks P.A.-C.  [facility]   5/20/2025         [1]   Past Medical History:  Diagnosis Date    Anxiety     Depression     Head ache     Migraine     with aura    Urinary tract infection    [2]   Past Surgical History:  Procedure Laterality Date    TUBAL LIGATION Right 10/2023   [3]   Social History  Tobacco Use    Smoking status: Former     Current packs/day: 0.00     Types: Cigarettes     Quit date: 2015     Years since quitting: 10.3    Smokeless tobacco: Never   Vaping Use    Vaping status: Every Day    Substances: Nicotine    Devices: Disposable   Substance Use Topics    Alcohol use: Never    Drug use: Yes     Comment: gladys   [4] No Known Allergies

## 2025-05-20 NOTE — PROGRESS NOTES
Pt presents for annual exam   Last seen on: New Patient  Phone: [phone]   Pharmacy verified   LMP: 4/22/2025  BCM: IUD Mirena (placed 2020)   Sexually active: yes, multiple   Last pap: over a year ago.   RITCHIE sent today   Pt states last month she had her first period in 5 years. She thinks she also may have an STI so she would like testing today. She states that since January every time she has intercourse she is getting BV.   - would like hormones tested, she thinks she is getting stuck in different menstrual cycles.     - removed right fallopian tube 2.5 years ago   8cm ball of blood that burst.

## 2025-05-20 NOTE — ASSESSMENT & PLAN NOTE
- Patient reports history of right salpingectomy 2.5 years ago due to 8 cm paratubal cyst. This was done at [place] (RITCHIE obtained today).

## 2025-05-21 ENCOUNTER — RESULTS FOLLOW-UP (OUTPATIENT)
Dept: OBGYN | Facility: CLINIC | Age: 25
End: 2025-05-21
Payer: COMMERCIAL

## 2025-05-21 LAB
C TRACH DNA GENITAL QL NAA+PROBE: NEGATIVE
N GONORRHOEA DNA GENITAL QL NAA+PROBE: NEGATIVE
SPECIMEN SOURCE: NORMAL